# Patient Record
Sex: MALE | Race: BLACK OR AFRICAN AMERICAN | Employment: FULL TIME | URBAN - METROPOLITAN AREA
[De-identification: names, ages, dates, MRNs, and addresses within clinical notes are randomized per-mention and may not be internally consistent; named-entity substitution may affect disease eponyms.]

---

## 2021-02-27 ENCOUNTER — HOSPITAL ENCOUNTER (EMERGENCY)
Facility: CLINIC | Age: 51
Discharge: HOME | End: 2021-02-27
Attending: EMERGENCY MEDICINE | Admitting: EMERGENCY MEDICINE
Payer: COMMERCIAL

## 2021-02-27 VITALS
OXYGEN SATURATION: 98 % | HEIGHT: 75 IN | BODY MASS INDEX: 24.87 KG/M2 | WEIGHT: 200 LBS | RESPIRATION RATE: 16 BRPM | HEART RATE: 77 BPM | DIASTOLIC BLOOD PRESSURE: 98 MMHG | TEMPERATURE: 97.1 F | SYSTOLIC BLOOD PRESSURE: 146 MMHG

## 2021-02-27 DIAGNOSIS — R21 RASH: ICD-10-CM

## 2021-02-27 PROCEDURE — 250N000013 HC RX MED GY IP 250 OP 250 PS 637: Performed by: EMERGENCY MEDICINE

## 2021-02-27 PROCEDURE — 99283 EMERGENCY DEPT VISIT LOW MDM: CPT

## 2021-02-27 RX ORDER — PERMETHRIN 50 MG/G
CREAM TOPICAL ONCE
Qty: 30 G | Refills: 0 | Status: SHIPPED | OUTPATIENT
Start: 2021-02-27 | End: 2021-02-27

## 2021-02-27 RX ORDER — DIPHENHYDRAMINE HCL 25 MG
50 CAPSULE ORAL ONCE
Status: COMPLETED | OUTPATIENT
Start: 2021-02-27 | End: 2021-02-27

## 2021-02-27 RX ORDER — PREDNISONE 20 MG/1
TABLET ORAL
Qty: 10 TABLET | Refills: 0 | Status: SHIPPED | OUTPATIENT
Start: 2021-02-27

## 2021-02-27 RX ADMIN — DIPHENHYDRAMINE HYDROCHLORIDE 50 MG: 25 CAPSULE ORAL at 21:22

## 2021-02-27 ASSESSMENT — ENCOUNTER SYMPTOMS
VOMITING: 0
FEVER: 0
NAUSEA: 0
SHORTNESS OF BREATH: 0
COUGH: 0
DIARRHEA: 0

## 2021-02-27 ASSESSMENT — MIFFLIN-ST. JEOR: SCORE: 1852.82

## 2021-02-28 NOTE — DISCHARGE INSTRUCTIONS
Follow up with primary care provider if not improving  Use benadryl -over the counter medication to help with itching  STart the prednisone  Use the cream to make sure that it is not scabies  Wash all of your clothes in hot water   Watch for fever, skin blistering/peeling, sores in mouth, becoming sick

## 2021-02-28 NOTE — ED PROVIDER NOTES
"  History   Chief Complaint:  Rash    HPI   Braden Pedroza is a 50 year old male with history of hypertension who presents with a rash for the past 8 days. The rash began on his arm and has now spread to his back and buttocks as well has to his legs and feet. He denies fevers, shortness of breath, cough, nausea, vomting, diarrhea. The patient notes that he has been in [facility] for the last two months and just returned today. He was evaluated in a clinic in [facility] and given an allergy shot, however, symptoms did not subside.    He notes he has been consuming a new type of oil and thinks that could be a contributing factor. He denies exposure to anyone with a similar rash. Denies exposure to bed bugs or scabies.    Review of Systems   Constitutional: Negative for fever.   Respiratory: Negative for cough and shortness of breath.    Gastrointestinal: Negative for diarrhea, nausea and vomiting.   Skin: Positive for rash.   All other systems reviewed and are negative.    Allergies:  Ace inhibitors    Medications:  Norco    Past Medical History:    Hypertension    Hypertriglyceridemia      Social History:  The patient presents to the emergency department with his wife.     Physical Exam     Patient Vitals for the past 24 hrs:   BP Temp Temp src Pulse Resp SpO2 Height Weight   02/27/21 2005 (!) 146/98 -- -- 77 -- 98 % -- --   02/27/21 1714 (!) 161/99 97.1  F (36.2  C) Temporal 72 16 98 % 1.905 m (6' 3\") 90.7 kg (200 lb)       Physical Exam  General: Sitting up in bed  Eyes:  The pupils are equal and round    Conjunctivae and sclerae are normal  ENT:    No intra-orally lesions  Neck:  Normal range of motion  CV:  Regular rate, regular rhythm    Skin warm and well perfused   Resp:  Non labored breathing on room air    No tachypnea    No cough heard  MS:  Normal muscular tone  Skin:  Scatted erythematous papules on left mid back, upper butocks, bilateral forearms, bilateral thighs with excoriations. No erythema. No hives, " vesicles. No rash on palms, soles  Neuro:   Awake, alert.      Speech is normal and fluent.    Face is symmetric.     Moves all extremities equally  Psych: Normal affect.  Appropriate interactions.    Emergency Department Course     Emergency Department Course:    Reviewed:  I reviewed vitals and past medical history    Assessments:  2042 I obtained history and examined the patient as noted above.     Interventions:  2122 Benadryl 50 mg PO    Disposition:  The patient was discharged to home.     Impression & Plan   CMS Diagnoses: None    Medical Decision Making:  Braden Pedroza is a 50 year old male who presented to the ED with rash. Unclear etiology of rash. Main concern is how itchy the rash is. No evidence of cellulitis, abscess, shingles, SJS. Has no systemic symptoms and looks well. No evidence of anaphylaxis. No evidence of mucous membrane involvement. He is worried about this being infectious. I doubt that this is the case but will treat for possible scabies as well as a dermatitis/allergic component. Recommended follow-up if not improving.    Diagnosis:    ICD-10-CM    1. Rash  R21      Discharge Medications:  New Prescriptions    PERMETHRIN (ELIMITE) 5 % EXTERNAL CREAM    Apply topically once for 1 dose Massage into skin from head to foot.  Leave on for 8-14hrs and then wash off.    PREDNISONE (DELTASONE) 20 MG TABLET    Take two tablets (= 40mg) each day for 5 (five) days     Scribe Disclosure:  I, Ruth Ceja, am serving as a scribe at 7:59 PM on 2/27/2021 to document services personally performed by Nanci Clarke MD based on my observations and the provider's statements to me.      Nanci Clarke MD  02/28/21 0030

## 2021-02-28 NOTE — ED NOTES
Patient reports that he has been in [place] for the last two months and that 8 days ago he noted a very itchy rash all over his body. Patient states that he went to a clinic in [facility] and that he got an allergy shot however he said it has had no improvement

## 2021-03-30 ENCOUNTER — HOSPITAL ENCOUNTER (EMERGENCY)
Facility: CLINIC | Age: 51
Discharge: HOME | End: 2021-03-30
Attending: PHYSICIAN ASSISTANT | Admitting: PHYSICIAN ASSISTANT
Payer: COMMERCIAL

## 2021-03-30 ENCOUNTER — APPOINTMENT (OUTPATIENT)
Dept: GENERAL RADIOLOGY | Facility: CLINIC | Age: 51
End: 2021-03-30
Attending: PHYSICIAN ASSISTANT
Payer: COMMERCIAL

## 2021-03-30 VITALS
BODY MASS INDEX: 24.99 KG/M2 | RESPIRATION RATE: 14 BRPM | WEIGHT: 201 LBS | TEMPERATURE: 97.9 F | OXYGEN SATURATION: 99 % | HEART RATE: 76 BPM | DIASTOLIC BLOOD PRESSURE: 96 MMHG | HEIGHT: 75 IN | SYSTOLIC BLOOD PRESSURE: 131 MMHG

## 2021-03-30 DIAGNOSIS — R07.89 ATYPICAL CHEST PAIN: ICD-10-CM

## 2021-03-30 LAB
ANION GAP SERPL CALCULATED.3IONS-SCNC: 5 MMOL/L (ref 3–14)
BASOPHILS # BLD AUTO: 0 10E9/L (ref 0–0.2)
BASOPHILS NFR BLD AUTO: 0.5 %
BUN SERPL-MCNC: 12 MG/DL (ref 7–30)
CALCIUM SERPL-MCNC: 8.6 MG/DL (ref 8.5–10.1)
CHLORIDE SERPL-SCNC: 107 MMOL/L (ref 94–109)
CO2 SERPL-SCNC: 25 MMOL/L (ref 20–32)
CREAT SERPL-MCNC: 0.77 MG/DL (ref 0.66–1.25)
D DIMER PPP FEU-MCNC: 0.3 UG/ML FEU (ref 0–0.5)
DIFFERENTIAL METHOD BLD: NORMAL
EOSINOPHIL # BLD AUTO: 0.2 10E9/L (ref 0–0.7)
EOSINOPHIL NFR BLD AUTO: 3.8 %
ERYTHROCYTE [DISTWIDTH] IN BLOOD BY AUTOMATED COUNT: 13.2 % (ref 10–15)
GFR SERPL CREATININE-BSD FRML MDRD: >90 ML/MIN/{1.73_M2}
GLUCOSE SERPL-MCNC: 107 MG/DL (ref 70–99)
HCT VFR BLD AUTO: 49.9 % (ref 40–53)
HGB BLD-MCNC: 16.8 G/DL (ref 13.3–17.7)
IMM GRANULOCYTES # BLD: 0 10E9/L (ref 0–0.4)
IMM GRANULOCYTES NFR BLD: 0.5 %
INTERPRETATION ECG - MUSE: NORMAL
LYMPHOCYTES # BLD AUTO: 1.8 10E9/L (ref 0.8–5.3)
LYMPHOCYTES NFR BLD AUTO: 31.9 %
MCH RBC QN AUTO: 29.7 PG (ref 26.5–33)
MCHC RBC AUTO-ENTMCNC: 33.7 G/DL (ref 31.5–36.5)
MCV RBC AUTO: 88 FL (ref 78–100)
MONOCYTES # BLD AUTO: 0.5 10E9/L (ref 0–1.3)
MONOCYTES NFR BLD AUTO: 8.3 %
NEUTROPHILS # BLD AUTO: 3.2 10E9/L (ref 1.6–8.3)
NEUTROPHILS NFR BLD AUTO: 55 %
NRBC # BLD AUTO: 0 10*3/UL
NRBC BLD AUTO-RTO: 0 /100
PLATELET # BLD AUTO: 250 10E9/L (ref 150–450)
POTASSIUM SERPL-SCNC: 3.9 MMOL/L (ref 3.4–5.3)
RBC # BLD AUTO: 5.65 10E12/L (ref 4.4–5.9)
SODIUM SERPL-SCNC: 137 MMOL/L (ref 133–144)
TROPONIN I SERPL-MCNC: <0.015 UG/L (ref 0–0.04)
WBC # BLD AUTO: 5.8 10E9/L (ref 4–11)

## 2021-03-30 PROCEDURE — 85025 COMPLETE CBC W/AUTO DIFF WBC: CPT | Performed by: EMERGENCY MEDICINE

## 2021-03-30 PROCEDURE — 93005 ELECTROCARDIOGRAM TRACING: CPT

## 2021-03-30 PROCEDURE — 99285 EMERGENCY DEPT VISIT HI MDM: CPT | Mod: 25

## 2021-03-30 PROCEDURE — 84484 ASSAY OF TROPONIN QUANT: CPT | Performed by: EMERGENCY MEDICINE

## 2021-03-30 PROCEDURE — 85379 FIBRIN DEGRADATION QUANT: CPT | Performed by: EMERGENCY MEDICINE

## 2021-03-30 PROCEDURE — 250N000013 HC RX MED GY IP 250 OP 250 PS 637: Performed by: PHYSICIAN ASSISTANT

## 2021-03-30 PROCEDURE — 80048 BASIC METABOLIC PNL TOTAL CA: CPT | Performed by: EMERGENCY MEDICINE

## 2021-03-30 PROCEDURE — 71046 X-RAY EXAM CHEST 2 VIEWS: CPT

## 2021-03-30 PROCEDURE — 250N000009 HC RX 250: Performed by: PHYSICIAN ASSISTANT

## 2021-03-30 RX ORDER — ASPIRIN 81 MG/1
324 TABLET, CHEWABLE ORAL ONCE
Status: COMPLETED | OUTPATIENT
Start: 2021-03-30 | End: 2021-03-30

## 2021-03-30 RX ORDER — LIDOCAINE HYDROCHLORIDE 20 MG/ML
15 SOLUTION OROPHARYNGEAL ONCE
Status: COMPLETED | OUTPATIENT
Start: 2021-03-30 | End: 2021-03-30

## 2021-03-30 RX ORDER — MAGNESIUM HYDROXIDE/ALUMINUM HYDROXICE/SIMETHICONE 120; 1200; 1200 MG/30ML; MG/30ML; MG/30ML
15 SUSPENSION ORAL ONCE
Status: COMPLETED | OUTPATIENT
Start: 2021-03-30 | End: 2021-03-30

## 2021-03-30 RX ADMIN — ASPIRIN 81 MG CHEWABLE TABLET 324 MG: 81 TABLET CHEWABLE at 17:25

## 2021-03-30 RX ADMIN — LIDOCAINE HYDROCHLORIDE 15 ML: 20 SOLUTION ORAL; TOPICAL at 17:26

## 2021-03-30 RX ADMIN — ALUMINUM HYDROXIDE, MAGNESIUM HYDROXIDE, AND SIMETHICONE 15 ML: 200; 200; 20 SUSPENSION ORAL at 17:26

## 2021-03-30 ASSESSMENT — ENCOUNTER SYMPTOMS
BLOOD IN STOOL: 0
COUGH: 0
RHINORRHEA: 0
NAUSEA: 0
DIARRHEA: 0
FEVER: 0
ABDOMINAL PAIN: 0
CHILLS: 0
VOMITING: 0

## 2021-03-30 ASSESSMENT — MIFFLIN-ST. JEOR: SCORE: 1857.36

## 2021-03-30 NOTE — ED PROVIDER NOTES
"  History   Chief Complaint:  Left sided chest pain    HPI   Braden Pedroza is a 50 year old male with history of hypertension who presents with left sided chest pain. The patient reports having sudden onset, non radiating left sided chest pain starting at 1900 last night while sitting and watching TV. He describes the pain as dull and constant, stating that nothing exacerbates or alleviates the pain. He rates his pain as a 7/10 at its peak but a 4/10 currently. He has not taken any medications for it. He reports being on regular blood pressure medications. He denies being on blood thinners or recent surgeries. He also denies fever, chills, shortness of breath, cough, rhinorrhea, congestion, abdominal pain, nausea, emesis, diarrhea, bloody stools, and leg swelling.    Review of Systems   Constitutional: Negative for chills and fever.   HENT: Negative for congestion and rhinorrhea.    Respiratory: Negative for cough.    Cardiovascular: Positive for chest pain. Negative for leg swelling.   Gastrointestinal: Negative for abdominal pain, blood in stool, diarrhea, nausea and vomiting.   All other systems reviewed and are negative.      Allergies:  Ace inhibitors    Medications:  Prednisone    Past Medical History:    Hypertension    Social History:  The patient presents by himself    Physical Exam     Patient Vitals for the past 24 hrs:   BP Temp Temp src Pulse Resp SpO2 Height Weight   03/30/21 1655 124/87 -- -- 88 18 99 % -- --   03/30/21 1346 (!) 157/93 97.9  F (36.6  C) Temporal 79 16 98 % 1.905 m (6' 3\") 91.2 kg (201 lb)       Physical Exam  Constitutional: Pleasant. Cooperative.   Eyes: Pupils equally round and reactive  HENT: Head is normal in appearance. Oropharynx is normal with moist mucus membranes.  Cardiovascular: Regular rate and rhythm and without murmurs.  Respiratory: Normal respiratory effort, lungs are clear bilaterally.  GI: Abdomen is soft, non-tender, non-distended. No guarding, rebound, or " rigidity.  Musculoskeletal: No asymmetry of the lower extremities. TTP to chest wall in area of pain.  Skin: Normal, without rash.  Neurologic: Cranial nerves grossly intact, normal cognition, no focal deficits. Alert and oriented x 3.   Psychiatric: Normal affect.  Nursing notes and vital signs reviewed.    Emergency Department Course   ECG (13:45:46):  Rate 81 bpm. TX interval 172. QRS duration 104. QT/QTc 362/420. P-R-T axes 42 -51 64. Normal sinus rhythm. Left anterior fascicular block. Nonspecific T wave abnormality flattened. Abnormal EKG. Interpreted at 1717 by Jamel Garay PA-C.    Imaging:    X-ray Chest, 2 views:  There are no acute infiltrates. The cardiac silhouette is not enlarged. Pulmonary vasculature is unremarkable.  Result per radiology.     Laboratory:    CBC: WBC: 5.8, HGB: 16.8, PLT: 250  BMP: Glucose 107 (H), o/w WNL (Creatinine: 0.77)  Troponin (Collected 1352): <0.015  D-dimer: 0.3    Emergency Department Course:    Reviewed:    I reviewed the patient's nursing notes, vitals, past medical records, Care Everywhere.       HEART Score  Background  Calculates the overall risk of adverse event in patient's presenting with chest pain.  Based on 5 criteria (each assigned 0-2 points) including suspiciousness of history, EKG, age, risk factors and troponin.    Data  50 year old male  does not have a problem list on file.   reports that he has never smoked. He does not have any smokeless tobacco history on file.  family history is not on file.  Lab Results   Component Value Date    TROPI <0.015 03/30/2021     Criteria   0-2 points for each of 5 items (maximum of 10 points):  Score 0- History slightly suspicious for coronary syndrome  Score 1- EKG with Non-specific repolarization disturbance  Score 1- Age 45 to 65 years old  Score 1- One to 2 risk factors for atherosclerotic disease  Score 0- Within normal limits for troponin levels  Interpretation  Risk of adverse outcome  Heart Score: 3  Total Score  0-3- Adverse Outcome Risk 2.5% - Supports early discharge with appropriate follow-up    Assessments:    1717: I performed an exam of the patient and obtained history, as documented above.    1830: I rechecked the patient and updated them on findings. They are amenable to discharge.     Interventions:  1725: Aspirin 324 mg PO  1726: GI cocktail 30 mL PO    Disposition:  The patient was discharged to home.       Impression & Plan   Medical Decision Making:  Braden Pedroza is a 50 year old male who presents to the ED for evaluation of chest pain.  See HPI as above for additional details.  Vitals and physical exam as above.  Differential was broad and included ACS, dissection, PE, pneumothorax, GERD, MSK, pericarditis, pneumonia, among others.  Work-up as above.  Discussed with patient unclear etiology of his symptoms at this point in time.  Suspect MSK as pain is reproducible on exam.  Doubt ACS as troponin is negative despite 6+ hours of symptoms.  D-dimer is negative suggesting against PE.  Chest x-ray is reassuring.  ECG without evidence for ST changes. HEART score 3. [place] patient was safe for discharge to home with conservative management.  Advise follow-up with PCP. Discussed reasons to return. All questions answered. Patient discharged to home in stable condition.    Diagnosis:    ICD-10-CM    1. Atypical chest pain  R07.89      Scribe Disclosure:  I, Arlette Grey, am serving as a scribe at 5:11 PM on 3/30/2021 to document services personally performed by Jamel Garay PA-C based on my observations and the provider's statements to me.    This record was created at least in part using electronic voice recognition software, so please excuse any typographical errors.       Jamel Garay PA-C  03/31/21 0029